# Patient Record
(demographics unavailable — no encounter records)

---

## 2024-10-09 NOTE — PHYSICAL EXAM
[FreeTextEntry1] : DENIES DEPRESSION OR MOOD CHANGE [Normal] : mucosa is normal [Midline] : trachea located in midline position

## 2024-10-09 NOTE — HISTORY OF PRESENT ILLNESS
[de-identified] : THROAT DISCOMFORT FOR A WHILE DYSPHAGIA? FEELING TIRED AT TIMES MEDICAL HX REVIEWED

## 2024-10-17 NOTE — PROCEDURE
[FreeTextEntry1] : CXR reveals a normal sized heart; no evidence of infiltrate or effusion--a normal appearing chest radiograph  Full PFT reveals moderate restrictive dysfunction and obstructive dysfunction; FEV1 was  1.56L which is 52% of predicted; mildly reduced lung volumes; mildly reduced diffusion at 4.01, which is 68% of predicted; normal flow volume loop. PFTs were performed to evaluate for SOB and asthma 23% improvement on BD  6 minute walk test reveals a low saturation of 96% with no evidence of dyspnea or fatigue; walked 391.2  meters   FENO was 17; a normal value being less than 25 Fractional exhaled nitric oxide (FENO) is regarded as a simple, noninvasive method for assessing eosinophilic airway inflammation. Produced by a variety of cells within the lung, nitric oxide (NO) concentrations are generally low in healthy individuals. However, high concentrations of NO appear to be involved in nonspecific host defense mechanisms and chronic inflammatory diseases such as asthma. The American Thoracic Society (ATS) therefore has recommended using FENO to aid in the diagnosis and monitoring of eosinophilic airway inflammation and asthma, and for identifying steroid responsive individuals whose chronic respiratory symptoms may be caused by airway inflammation.   The American Thoracic Society (ATS) strongly recommends the use of FeNO measurement to aid in the assessment, management, and long-term monitoring of asthma. In their 2011 clinical practice guideline, the ATS emphasizes the importance of using FeNO.   CT of the neck 12/12/2023 impression: RL tongue lesion, 2.9 x 2.2 nodule in thyroid   CT of the chest 9/2024 impression: No acute inflammatory changes. Cholelithiasis. Splenomegaly.  CT of chest impression: diffuse severe isidro bronchial thickening, multiple foci of mucus plugging, mild bronchiectasis in lower lobes   CBC 10/2024: HGB 11.3, , 8% eosinophils

## 2024-10-17 NOTE — HISTORY OF PRESENT ILLNESS
[TextBox_4] : Mr. VELÁZQUEZ is a 61 year old male originally from [place] with a history of diabetes, thyroid nodule, GERD, former smoker (10 pack years), Crohn's disease/colitis, iron deficiency, chronic cough, s/p alcoholism resulting in stomach ulcers presenting to the office today for an initial pulmonary evaluation for SOB, Bronchiectasis, ?Eosinophilic Asthma and Eosinophilic PNA, ?EGPA ?allergies, GERD/EOE, likely ABDI. His chief complaint is  -he notes throat irritation from throat clearing  -he notes WARE  -he notes SOB with exacerbation  -he notes frequent cough  -he notes globus pharyngeus -he notes bowels are regular -he notes occasional constipation  -he notes sinuses are quiet -he notes seeing [facility] for his GI issues  -he notes GERD Sx -he notes his cough has worsened in the past few months  -he notes being sick for an extended part of the year  -he notes fatigue  -he notes excess bathroom use which has worsened recently (up to 15 time per day)  -he notes being in bed for 22 hours a day  -he notes stomach ulcers  -he notes emesis upon eating  -he notes chronic lung inflammation  -he notes appetite is poor  -he notes snoring  -he notes ability to fall asleep while watching TV and while in a car -he notes his memory and concentration are poor at times -he notes leg pains  -he notes weight is stable -he notes vision is stable -he notes balance is stable -he notes arthritis   -he denies any headaches, nausea, fever, chills, sweats, chest pain, chest pressure, wheezing, palpitations, vertigo, dysphagia, itchy eyes, itchy ears, leg swelling, myalgias, or sour taste in the mouth.

## 2024-10-17 NOTE — ASSESSMENT
[FreeTextEntry1] : Mr. VELÁZQUEZ is a 61 year old male originally from [place] with a history of diabetes, thyroid nodule, GERD, former smoker (10 pack years), Crohn's disease/colitis, iron deficiency, chronic cough, s/p alcoholism resulting in stomach ulcers presenting to the office today for an initial pulmonary evaluation for SOB, Bronchiectasis, ?Eosinophilic Asthma and Eosinophilic PNA, ?EGPA ?allergies, GERD/EOE, likely ABDI.   His shortness of breath is multifactorial due to: -poor mechanics of breathing -out of shape -over weight -pulmonary disease   -?Eosinophilic Asthma and Eosinophilic PNA   -Bronchiectasis    -EGPA -?cardiac disease  Problem 1: ?Eosinophilic Asthma -add Trelegy ( 200 ) 1 puff QD -candidate for Nucala, Fasenra, Dupixent, and Tezspire -Inhaler technique reviewed as well as oral hygiene techniques reviewed with patient. Avoidance of cold air, extremes of temperature, rescue inhaler should be used before exercise. Order of medication reviewed with patient. Recommended use of a cool mist humidifier in the bedroom -The safety and efficacy of Nucala was established in three double-blind, randomized, placebo-controlled trials in patients with severe asthma. Compared to a placebo, patients with severe asthma receiving Nucala had fewer exacerbation requiring hospitalization and/or emergency department visits, and a longer time to first exacerbation. In addition, patients with severe asthma receiving Nucala or Fasenra experienced greater reductions in their daily maintenance oral corticosteroid dose, while maintaining asthma control compared with patients receiving placebo. Treatment with Nucala did not result in a significant improvement in lung function, as measured by the volume of air exhaled by patients in one second. The most common side effects include: headache, injection site reactions, back pain, weakness, and fatigue; hypersensitivity reactions can occur within hours or days including swelling of the face, mouth, and tongue, fainting, dizziness, hives, breathing problems, and rash; herpes zoster infections have occurred. The drug is a monoclonal antibody that inhibits interleukin-5 which helps regular eosinophils, a type of white blood cell that contributes to asthma. The over-production of eosinophils can cause inflammation in the lungs, increasing the frequency of asthma attacks. Patients must also take other medications, including high dose inhaled corticosteroids and at least one additional asthma drug.  -Dupixent is a prescription medicine used with other asthma medicines for the maintenance treatment of moderate-to-severe asthma in people aged 12 years and older whose asthma is not controlled with their current asthma medicines. Dupixent helps prevent severe asthma attacks (exacerbations) and can improve your breathing. Dupixent may also help reduce the amount of oral corticosteroids you need while preventing severe asthma attacks and improving your breathing. Dupixent is not used to treat sudden breathing problems. Risks and side effects of Dupixent were discussed and reviewed with patient.  problem 1A: Bronchiectasis (more likely than Eosinophilic PNA) -complete immunologic blood work  -complete sputum AFB X 3 -complete sputum C&S -recommended Aerobika mucus clearing device -Seen on the CT of the chest or chest X-ray signifies damaged bronchial tubes focal or diffuse which can be sites of recurrent infections. These areas can be colonized by various organisms including bacteria (Haemophilus influenzae, Pseudomonas species, etc.), as well as acid fast bacilli (mycobacterial disease inclusive of TB/[place] etc.) Sputum either for bacteria culture/sensitivity or AFB culture and sensitivity will need to be sent if the patient has sputum- 3 specimens on consecutive days will need to be dropped at the laboratory- if the patient can produce sputum.  problem 1B: EOE -treatment as per Leo -complete blood work to test for Strongyloides antibodies   problem 2: ?EGPA -complete ANCA and PNCA  problem 3: Allergies/Sinus -complete blood work to include: asthma panel, food IgE panel, IgE level, eosinophil level, vitamin D level -Environmental measures for allergies were encouraged including mattress and pillow covers, air purifier, and environmental controls.   Problem 4: GERD  -treatment as per Leo -continue Pantoprazole 40 mg QAM, pre-breakfast -Rule of 2s: avoid eating too much, eating too late, eating too spicy, eating two hours before bed. -Things to avoid including overeating, spicy foods, tight clothing, eating within three hours of bed, this list is not all inclusive. -For treatment of reflux, possible options discussed including diet control, H2 blockers, PPIs, as well as coating motility agents discussed as treatment options. Timing of meals and proximity of last meal to sleep were discussed. If symptoms persist, a formal gastrointestinal evaluation is needed.  problem 4A: preop pulmonary clearance for colonoscopy and endoscopy -at this point in time there are no absolute pulmonary contraindications to go forward with the planned procedure   Problem 5: ?ABDI (elevated Mallampati class, poor quality sleep, snoring, neck size >16) -complete home sleep study -Sleep apnea is associated with adverse clinical consequences which can affect most organ systems. Cardiovascular disease risk includes arrhythmias, atrial fibrillation, hypertension, coronary artery disease, and stroke. Metabolic disorders include diabetes type 2, non-alcoholic fatty liver disease. Mood disorder especially depression; and cognitive decline especially in the elderly. Associations with chronic reflux/Barretts esophagus some but not all inclusive. -Reasons include arousal consistent with hypopnea; respiratory events most prominent in REM sleep or supine position; therefore sleep staging and body position are important for accurate diagnosis and estimation of AHI.   problem 6: ?cardiac disease -recommended to see a Cardiologist as needed   problem 7: poor breathing mechanics -Proper breathing techniques were reviewed with an emphasis of exhalation. Patient instructed to breathe in for 1 second and out for 4 seconds. Patient was encouraged to not talk while walking.   problem 8: overweight/ out of shape -Weight loss, exercise, and diet control were discussed and are highly encouraged. Treatment options are given such as, aqua therapy, and contacting a nutritionist. Recommended to use the elliptical, stationary bike, less use of treadmill.   problem 9: health maintenance -recommended yearly flu shot after October 15, 2023 -recommended strep pneumonia vaccines: Prevnar-20 vaccine, followed by Pneumo vaccine 23 one year following after 65 years old. -recommended early intervention for Upper Respiratory Infections (URIs) -recommended regular osteoporosis evaluations -recommended early dermatological evaluations -recommended after the age of 50 to the age of 70, colonoscopy every 5 years   F/P in 6-8 weeks. He is encouraged to call with any changes, concerns, or questions

## 2024-10-17 NOTE — ADDENDUM
[FreeTextEntry1] : Documented by Talha Davalos acting as a scribe for Dr. Chandler Wetzel on 10/17/2024. All medical record entries made by the Scribe were at my, Dr. Chandler Wetzel's, direction and personally dictated by me on 10/17/2024. I have reviewed the chart and agree that the record accurately reflects my personal performance of the history, physical exam, assessment and plan. I have also personally directed, reviewed, and agree with the discharge instructions.

## 2024-10-17 NOTE — REASON FOR VISIT
[Initial] : an initial visit [Spouse] : spouse [Family Member] : family member [TextBox_44] : SOB, Bronchiectasis, ?Eosinophilic Asthma and Eosinophilic PNA, ?EGPA ?allergies, GERD/EOE, likely ABDI.

## 2024-11-14 NOTE — REASON FOR VISIT
[Subsequent Evaluation] : a subsequent evaluation for [FreeTextEntry2] : Strobe results/ bad congestion-

## 2024-11-14 NOTE — ASSESSMENT
[FreeTextEntry1] : GERD INSTRUCTIONS PEPCID VIDEOSTROBOSCOPY REVIEWED GI REFERRAL DIET DISCUSSED UP TO DATE PMD EXAM/ HORMONAL EVALUATION

## 2024-11-14 NOTE — HISTORY OF PRESENT ILLNESS
[de-identified] : THROAT DISCOMFORT FOR A WHILE F/U FEELING BETTER WITH GERD TREATMENT VIDEOSTROBOSCOPY DONE DYSPHAGIA? FEELING TIRED AT TIMES MEDICAL HX REVIEWED